# Patient Record
Sex: FEMALE | HISPANIC OR LATINO | URBAN - METROPOLITAN AREA
[De-identification: names, ages, dates, MRNs, and addresses within clinical notes are randomized per-mention and may not be internally consistent; named-entity substitution may affect disease eponyms.]

---

## 2021-05-08 ENCOUNTER — EMERGENCY (EMERGENCY)
Facility: HOSPITAL | Age: 51
LOS: 1 days | Discharge: HOME | End: 2021-05-08
Attending: EMERGENCY MEDICINE
Payer: MEDICAID

## 2021-05-08 VITALS
TEMPERATURE: 101 F | OXYGEN SATURATION: 98 % | SYSTOLIC BLOOD PRESSURE: 154 MMHG | HEART RATE: 104 BPM | WEIGHT: 139.99 LBS | HEIGHT: 67 IN | DIASTOLIC BLOOD PRESSURE: 83 MMHG | RESPIRATION RATE: 20 BRPM

## 2021-05-08 PROCEDURE — 99220: CPT

## 2021-05-08 PROCEDURE — 93010 ELECTROCARDIOGRAM REPORT: CPT

## 2021-05-08 NOTE — ED ADULT TRIAGE NOTE - CHIEF COMPLAINT QUOTE
Patient has been having a burning lower back and leg pain with headaches since Thursday worsening today. Febrile in triage. Describes "stinging urination"

## 2021-05-09 LAB
ALBUMIN SERPL ELPH-MCNC: 4.9 G/DL — SIGNIFICANT CHANGE UP (ref 3.3–5)
ALP SERPL-CCNC: 80 U/L — SIGNIFICANT CHANGE UP (ref 40–120)
ALT FLD-CCNC: 14 U/L — SIGNIFICANT CHANGE UP (ref 10–45)
ANION GAP SERPL CALC-SCNC: 15 MMOL/L — SIGNIFICANT CHANGE UP (ref 5–17)
ANISOCYTOSIS BLD QL: SIGNIFICANT CHANGE UP
APPEARANCE UR: CLEAR — SIGNIFICANT CHANGE UP
APTT BLD: 26.4 SEC — LOW (ref 27.5–35.5)
AST SERPL-CCNC: 17 U/L — SIGNIFICANT CHANGE UP (ref 10–40)
BASE EXCESS BLDV CALC-SCNC: 1.2 MMOL/L — SIGNIFICANT CHANGE UP (ref -2–2)
BASOPHILS # BLD AUTO: 0.19 K/UL — SIGNIFICANT CHANGE UP (ref 0–0.2)
BASOPHILS NFR BLD AUTO: 2.6 % — HIGH (ref 0–2)
BILIRUB SERPL-MCNC: 0.3 MG/DL — SIGNIFICANT CHANGE UP (ref 0.2–1.2)
BILIRUB UR-MCNC: NEGATIVE — SIGNIFICANT CHANGE UP
BUN SERPL-MCNC: 13 MG/DL — SIGNIFICANT CHANGE UP (ref 7–23)
CA-I SERPL-SCNC: 1.25 MMOL/L — SIGNIFICANT CHANGE UP (ref 1.12–1.3)
CALCIUM SERPL-MCNC: 10.2 MG/DL — SIGNIFICANT CHANGE UP (ref 8.4–10.5)
CHLORIDE BLDV-SCNC: 111 MMOL/L — HIGH (ref 96–108)
CHLORIDE SERPL-SCNC: 106 MMOL/L — SIGNIFICANT CHANGE UP (ref 96–108)
CO2 BLDV-SCNC: 26 MMOL/L — SIGNIFICANT CHANGE UP (ref 22–30)
CO2 SERPL-SCNC: 19 MMOL/L — LOW (ref 22–31)
COLOR SPEC: COLORLESS — SIGNIFICANT CHANGE UP
CREAT SERPL-MCNC: 0.7 MG/DL — SIGNIFICANT CHANGE UP (ref 0.5–1.3)
DACRYOCYTES BLD QL SMEAR: SLIGHT — SIGNIFICANT CHANGE UP
DIFF PNL FLD: NEGATIVE — SIGNIFICANT CHANGE UP
ELLIPTOCYTES BLD QL SMEAR: SLIGHT — SIGNIFICANT CHANGE UP
EOSINOPHIL # BLD AUTO: 0 K/UL — SIGNIFICANT CHANGE UP (ref 0–0.5)
EOSINOPHIL NFR BLD AUTO: 0 % — SIGNIFICANT CHANGE UP (ref 0–6)
FERRITIN SERPL-MCNC: 2 NG/ML — LOW (ref 15–150)
GAS PNL BLDV: 139 MMOL/L — SIGNIFICANT CHANGE UP (ref 135–145)
GAS PNL BLDV: SIGNIFICANT CHANGE UP
GAS PNL BLDV: SIGNIFICANT CHANGE UP
GIANT PLATELETS BLD QL SMEAR: PRESENT — SIGNIFICANT CHANGE UP
GLUCOSE BLDV-MCNC: 93 MG/DL — SIGNIFICANT CHANGE UP (ref 70–99)
GLUCOSE SERPL-MCNC: 87 MG/DL — SIGNIFICANT CHANGE UP (ref 70–99)
GLUCOSE UR QL: NEGATIVE — SIGNIFICANT CHANGE UP
HCO3 BLDV-SCNC: 25 MMOL/L — SIGNIFICANT CHANGE UP (ref 21–29)
HCT VFR BLD CALC: 28.6 % — LOW (ref 34.5–45)
HCT VFR BLDA CALC: 25 % — LOW (ref 39–50)
HGB BLD CALC-MCNC: 8 G/DL — LOW (ref 11.5–15.5)
HGB BLD-MCNC: 7.7 G/DL — LOW (ref 11.5–15.5)
HYPOCHROMIA BLD QL: SLIGHT — SIGNIFICANT CHANGE UP
INR BLD: 1.06 RATIO — SIGNIFICANT CHANGE UP (ref 0.88–1.16)
IRON SATN MFR SERPL: 19 UG/DL — LOW (ref 30–160)
IRON SATN MFR SERPL: 4 % — LOW (ref 14–50)
KETONES UR-MCNC: NEGATIVE — SIGNIFICANT CHANGE UP
LACTATE BLDV-MCNC: 1 MMOL/L — SIGNIFICANT CHANGE UP (ref 0.7–2)
LEUKOCYTE ESTERASE UR-ACNC: NEGATIVE — SIGNIFICANT CHANGE UP
LYMPHOCYTES # BLD AUTO: 1.15 K/UL — SIGNIFICANT CHANGE UP (ref 1–3.3)
LYMPHOCYTES # BLD AUTO: 15.5 % — SIGNIFICANT CHANGE UP (ref 13–44)
MACROCYTES BLD QL: SLIGHT — SIGNIFICANT CHANGE UP
MANUAL SMEAR VERIFICATION: SIGNIFICANT CHANGE UP
MCHC RBC-ENTMCNC: 16.5 PG — LOW (ref 27–34)
MCHC RBC-ENTMCNC: 26.9 GM/DL — LOW (ref 32–36)
MCV RBC AUTO: 61.4 FL — LOW (ref 80–100)
MICROCYTES BLD QL: SIGNIFICANT CHANGE UP
MONOCYTES # BLD AUTO: 0.39 K/UL — SIGNIFICANT CHANGE UP (ref 0–0.9)
MONOCYTES NFR BLD AUTO: 5.2 % — SIGNIFICANT CHANGE UP (ref 2–14)
NEUTROPHILS # BLD AUTO: 5.7 K/UL — SIGNIFICANT CHANGE UP (ref 1.8–7.4)
NEUTROPHILS NFR BLD AUTO: 76.7 % — SIGNIFICANT CHANGE UP (ref 43–77)
NITRITE UR-MCNC: NEGATIVE — SIGNIFICANT CHANGE UP
OB PNL STL: NEGATIVE — SIGNIFICANT CHANGE UP
OTHER CELLS CSF MANUAL: 3 ML/DL — LOW (ref 18–22)
OVALOCYTES BLD QL SMEAR: SLIGHT — SIGNIFICANT CHANGE UP
PCO2 BLDV: 38 MMHG — SIGNIFICANT CHANGE UP (ref 35–50)
PH BLDV: 7.43 — SIGNIFICANT CHANGE UP (ref 7.35–7.45)
PH UR: 7.5 — SIGNIFICANT CHANGE UP (ref 5–8)
PLAT MORPH BLD: ABNORMAL
PLATELET # BLD AUTO: 255 K/UL — SIGNIFICANT CHANGE UP (ref 150–400)
PO2 BLDV: 22 MMHG — LOW (ref 25–45)
POIKILOCYTOSIS BLD QL AUTO: SIGNIFICANT CHANGE UP
POLYCHROMASIA BLD QL SMEAR: SLIGHT — SIGNIFICANT CHANGE UP
POTASSIUM BLDV-SCNC: 3.4 MMOL/L — LOW (ref 3.5–5.3)
POTASSIUM SERPL-MCNC: 3.8 MMOL/L — SIGNIFICANT CHANGE UP (ref 3.5–5.3)
POTASSIUM SERPL-SCNC: 3.8 MMOL/L — SIGNIFICANT CHANGE UP (ref 3.5–5.3)
PROT SERPL-MCNC: 7.8 G/DL — SIGNIFICANT CHANGE UP (ref 6–8.3)
PROT UR-MCNC: NEGATIVE — SIGNIFICANT CHANGE UP
PROTHROM AB SERPL-ACNC: 12.7 SEC — SIGNIFICANT CHANGE UP (ref 10.6–13.6)
RBC # BLD: 4.66 M/UL — SIGNIFICANT CHANGE UP (ref 3.8–5.2)
RBC # FLD: 18.8 % — HIGH (ref 10.3–14.5)
RBC BLD AUTO: ABNORMAL
SAO2 % BLDV: 29 % — LOW (ref 67–88)
SARS-COV-2 RNA SPEC QL NAA+PROBE: SIGNIFICANT CHANGE UP
SODIUM SERPL-SCNC: 140 MMOL/L — SIGNIFICANT CHANGE UP (ref 135–145)
SP GR SPEC: 1.01 — LOW (ref 1.01–1.02)
TARGETS BLD QL SMEAR: SLIGHT — SIGNIFICANT CHANGE UP
TIBC SERPL-MCNC: 449 UG/DL — HIGH (ref 220–430)
UIBC SERPL-MCNC: 431 UG/DL — HIGH (ref 110–370)
UROBILINOGEN FLD QL: NEGATIVE — SIGNIFICANT CHANGE UP
WBC # BLD: 7.43 K/UL — SIGNIFICANT CHANGE UP (ref 3.8–10.5)
WBC # FLD AUTO: 7.43 K/UL — SIGNIFICANT CHANGE UP (ref 3.8–10.5)

## 2021-05-09 PROCEDURE — 74177 CT ABD & PELVIS W/CONTRAST: CPT | Mod: 26,MA

## 2021-05-09 PROCEDURE — 99217: CPT

## 2021-05-09 PROCEDURE — 71045 X-RAY EXAM CHEST 1 VIEW: CPT | Mod: 26

## 2021-05-09 RX ORDER — SODIUM CHLORIDE 9 MG/ML
2000 INJECTION, SOLUTION INTRAVENOUS ONCE
Refills: 0 | Status: COMPLETED | OUTPATIENT
Start: 2021-05-09 | End: 2021-05-09

## 2021-05-09 RX ORDER — ACETAMINOPHEN 500 MG
975 TABLET ORAL ONCE
Refills: 0 | Status: COMPLETED | OUTPATIENT
Start: 2021-05-09 | End: 2021-05-09

## 2021-05-09 RX ORDER — CEFTRIAXONE 500 MG/1
1000 INJECTION, POWDER, FOR SOLUTION INTRAMUSCULAR; INTRAVENOUS ONCE
Refills: 0 | Status: COMPLETED | OUTPATIENT
Start: 2021-05-09 | End: 2021-05-09

## 2021-05-09 RX ORDER — FERROUS SULFATE 325(65) MG
1 TABLET ORAL
Qty: 60 | Refills: 0
Start: 2021-05-09 | End: 2021-06-07

## 2021-05-09 RX ORDER — CEFTRIAXONE 500 MG/1
1000 INJECTION, POWDER, FOR SOLUTION INTRAMUSCULAR; INTRAVENOUS EVERY 24 HOURS
Refills: 0 | Status: DISCONTINUED | OUTPATIENT
Start: 2021-05-09 | End: 2021-05-12

## 2021-05-09 RX ORDER — CEFDINIR 250 MG/5ML
1 POWDER, FOR SUSPENSION ORAL
Qty: 20 | Refills: 0
Start: 2021-05-09 | End: 2021-05-18

## 2021-05-09 RX ORDER — SODIUM CHLORIDE 9 MG/ML
1000 INJECTION, SOLUTION INTRAVENOUS
Refills: 0 | Status: DISCONTINUED | OUTPATIENT
Start: 2021-05-09 | End: 2021-05-12

## 2021-05-09 RX ORDER — FERROUS SULFATE 325(65) MG
325 TABLET ORAL
Refills: 0 | Status: DISCONTINUED | OUTPATIENT
Start: 2021-05-09 | End: 2021-05-12

## 2021-05-09 RX ORDER — KETOROLAC TROMETHAMINE 30 MG/ML
15 SYRINGE (ML) INJECTION EVERY 6 HOURS
Refills: 0 | Status: DISCONTINUED | OUTPATIENT
Start: 2021-05-09 | End: 2021-05-09

## 2021-05-09 RX ADMIN — Medication 15 MILLIGRAM(S): at 15:45

## 2021-05-09 RX ADMIN — Medication 15 MILLIGRAM(S): at 05:36

## 2021-05-09 RX ADMIN — Medication 15 MILLIGRAM(S): at 23:40

## 2021-05-09 RX ADMIN — SODIUM CHLORIDE 150 MILLILITER(S): 9 INJECTION, SOLUTION INTRAVENOUS at 22:49

## 2021-05-09 RX ADMIN — SODIUM CHLORIDE 2000 MILLILITER(S): 9 INJECTION, SOLUTION INTRAVENOUS at 03:00

## 2021-05-09 RX ADMIN — SODIUM CHLORIDE 150 MILLILITER(S): 9 INJECTION, SOLUTION INTRAVENOUS at 05:25

## 2021-05-09 RX ADMIN — Medication 975 MILLIGRAM(S): at 00:37

## 2021-05-09 RX ADMIN — SODIUM CHLORIDE 2000 MILLILITER(S): 9 INJECTION, SOLUTION INTRAVENOUS at 00:38

## 2021-05-09 RX ADMIN — Medication 325 MILLIGRAM(S): at 21:04

## 2021-05-09 RX ADMIN — Medication 975 MILLIGRAM(S): at 21:11

## 2021-05-09 RX ADMIN — CEFTRIAXONE 100 MILLIGRAM(S): 500 INJECTION, POWDER, FOR SOLUTION INTRAMUSCULAR; INTRAVENOUS at 02:47

## 2021-05-09 RX ADMIN — SODIUM CHLORIDE 150 MILLILITER(S): 9 INJECTION, SOLUTION INTRAVENOUS at 15:45

## 2021-05-09 RX ADMIN — Medication 15 MILLIGRAM(S): at 06:48

## 2021-05-09 RX ADMIN — Medication 975 MILLIGRAM(S): at 10:31

## 2021-05-09 NOTE — ED PROVIDER NOTE - NS ED ROS FT
CONST: + fevers, + chills, no trauma  EYES: no pain, no blurry vision   ENT: no sore throat, no epistaxis, no rhinorrhea  CV: no chest pain, no palpitations, no orthopnea, no extremity pain or swelling  RESP: no shortness of breath, no cough, no sputum, no pleurisy, no wheezing  ABD: no abdominal pain, + nausea, no vomiting, no diarrhea, no black or bloody stool  : + dysuria, no hematuria, + frequency, no urgency  MSK: no back pain, no neck pain, no extremity pain  NEURO: no headache, no sensory disturbances, no focal weakness, no dizziness  HEME: no easy bleeding or bruising  SKIN: no diaphoresis, no rash

## 2021-05-09 NOTE — ED CDU PROVIDER INITIAL DAY NOTE - ATTENDING CONTRIBUTION TO CARE
Patient is a 49 yo F with history of anemia here for 4 days of dysuria, fevers, chills and nausea. She reports severe back and leg pain. Denies vomiting. No chest pain, shortness of breath, cough, sore throat. Denies a history of kidney stones. No history of DVT/PE.    VS noted  Gen. uncomfortable, complaining of pain  HEENT: EOMI, mmm  Lungs: CTAB/L no C/ W /R   CVS: RRR   Abd; Soft, mild suprapubic tenderness, b/l CVA tenderness  Ext: no edema  Skin: no rash  Neuro AAOx3 non focal clear speech  a/p: flank pain, fevers, chills, nausea with dysuria - concern for pyelonephritis. Patient in CDU for continued IVF hydration, IV abx and continued monitoring.   - Jerrica MENDEZ. Patient is a 49 yo F with history of anemia here for 4 days of dysuria, fevers, chills and nausea. She reports severe back and leg pain. Denies vomiting. No chest pain, shortness of breath, cough, sore throat. Denies a history of kidney stones. No history of DVT/PE.    VS noted  Gen. uncomfortable, complaining of pain  HEENT: EOMI, mmm  Lungs: CTAB/L no C/ W /R   CVS: RRR   Abd; Soft, mild suprapubic tenderness, b/l CVA tenderness  Ext: no edema  Skin: no rash  Neuro AAOx3 non focal clear speech  a/p: flank pain, fevers, chills, nausea with dysuria - concern for pyelonephritis vs MSK pain. Patient in CDU for continued IVF hydration, IV abx and continued monitoring.   - Jerrica MENDEZ.

## 2021-05-09 NOTE — ED CDU PROVIDER INITIAL DAY NOTE - PROGRESS NOTE DETAILS
DANIEL Santos:  [id number] Patient seen at bedside in NAD.  VSS.  Patient resting comfortably but reports b/l back pain. + CVA tenderness on exam. will give tylenol and continue to monitor. if pt afebrile over the next few hours will likely discharge home DANIEL Santos:  [id number] Patient seen at bedside in NAD.  VSS.  Patient resting comfortably without complaints and Tylenol improved pts pain. Occult negative. CXR negative. pending CT AP pt con't to c/o lower back pain and now with pain in both lower legs and muscle pain.  no numbness or paresthesia.  pt also c/o h/a.   #[id number] used for hx.  P/E - alert, mod pain.  Back/spine - no rash or lesion.  tender para spinal muscles - no midline tenderness.  motor and sensory intact bilat.  no CVAT -  CT abdo/pelvis - fullness or left renal pelvis without obstructing stone.  ?partially treated pyelo vs MSK.  analgesia.  con't IV Ceftriaxone and reassess. DANIEL Santos: Patient seen at bedside in NAD.  VSS.  Patient resting and continues to endorse body aches specifically in mid back radiating to lower back. will give toradol and reassess. Will keep pt overnight for continued hydration IV abx and pain control per ED attending Dr. Gita mensah DANIEL Santos: Patient seen at bedside in NAD.  VSS.  Patient resting and continues to endorse body aches specifically in mid back radiating to lower back. will give toradol and reassess. Will keep pt overnight for continued hydration IV abx and pain control per ED attending Dr. Gita mensah     [id number] CDU PROGRESS NOTE DANIEL EDMOND: Received pt at 1900 sign-out. Pt resting in stretcher in NAD. Case/plan reviewed. VSS. Pt c/o bilateral lower back pain 4/10 with headache and body ache. On exam,+b/l CVA ttp, R>L. S1 S2 noted, RRR, lungs CTA b/l, BS x4 with soft, nontender abdomen. Will give Tylenol 975mg po and closely monitor. CDU PROGRESS NOTE PA FELI: Pt resting comfortably, reports feeling better. Will continue to monitor. CDU PROGRESS NOTE PA FELI: Pt re-vitaled, noted to have temp of 100.5F. Pt resting in stretcher, no signs of distress, reports generalized body aches and chills. Exam significant for bilateral CVA tenderness. Abdomens soft, non tender. No rebound or guarding. Will give Toradol 15mg IV and closely monitor.

## 2021-05-09 NOTE — ED PROVIDER NOTE - OBJECTIVE STATEMENT
50F with pmhx of anemia, previously on iron supplements presenting with CC of dysuria, fevers, chills, nausea for 4 days. Started having flank pain 50F with pmhx of anemia, previously on iron supplements presenting with CC of dysuria, fevers, chills, nausea for 4 days. Started having flank pain today radiating down to her hips. No vomiting, diarrhea, abdominal pain. No cough, sick contacts. Pt has no history of abdominal surgeries or kidney stones.

## 2021-05-09 NOTE — ED CDU PROVIDER INITIAL DAY NOTE - ABDOMINAL EXAM
Nt/Nd anterior abdominal wall without rebound, guarding or rigidity. +CVA ttp b/l, R>L./soft/nontender.../nondistended

## 2021-05-09 NOTE — ED CDU PROVIDER INITIAL DAY NOTE - DETAILS
Pyelonephritis  -IV ABX  -IVF  -PAIN/FEVER CONTROL  -RITESH CASTILLO  -CASE D/W ATTENDING Dr. Becerril

## 2021-05-09 NOTE — ED ADULT NURSE REASSESSMENT NOTE - NS ED NURSE REASSESS COMMENT FT1
@1900 Pt received from NILESH Reid. Pt oriented to CDU & plan of care was discussed. Pt A&O x 4. Pt in CDU for continued IVF hydration, IV abx and continued monitoring. Pt c/o 4/10 headache, and back pain, Pt declines any medications at this time. Pt denies any fever, chills, burning with urination, or urinary frequency or urgency. Pt pending Rocephin @2am. V/S stable, pt afebrile,  IV in place, patent and free of signs of infiltration. Pt resting in bed. Safety & comfort measures maintained. Call bell in reach. Will continue to monitor.

## 2021-05-09 NOTE — ED CDU PROVIDER DISPOSITION NOTE - CLINICAL COURSE
49 yo female Azeri-speaking female with PMHx anemia (was on iron pills previously presented to the ED c/o fever, chills, dysuria, nausea, and low back pain x 4 days. Symptoms started out mild then worsened yesterday, w/ flank pain radiating down to her hips, diffuse body aches. Additionally endorsed mild dry cough and mild frequency. Denied chest pain, shortness of breath, vomiting, hx abdominal surgeries, hx kidney stones, vaginal bleeding, BRBPR, melena, easy bruising, hematuria, neck pain/stiffness.  ED Course: Labs notable for h/h 7.7/28.6 (no anemia sxs), cmp wnl, RVP/COVID negative, lactate negative, UA negative however pt with +b/l CVA ttp, R>L concerning for pyelonephritis so patient was started on IV ceftriaxone 1g, IVF w/ improvement of sxs. Sent to CDU for continued IVF hydration, IV abx and continued monitoring. 49 yo female Qatari-speaking female with PMHx anemia (was on iron pills previously presented to the ED c/o fever, chills, dysuria, nausea, and low back pain x 4 days. Symptoms started out mild then worsened yesterday, w/ flank pain radiating down to her hips, diffuse body aches. Additionally endorsed mild dry cough and mild frequency. Denied chest pain, shortness of breath, vomiting, hx abdominal surgeries, hx kidney stones, vaginal bleeding, BRBPR, melena, easy bruising, hematuria, neck pain/stiffness.  ED Course: Labs notable for h/h 7.7/28.6 (no anemia sxs), cmp wnl, RVP/COVID negative, lactate negative, UA negative however pt with +b/l CVA ttp, R>L concerning for pyelonephritis so patient was started on IV ceftriaxone 1g, IVF w/ improvement of sxs. Sent to CDU for continued IVF hydration, IV abx and continued monitoring. Patient remained stable while in CDU and afebrile. pain well controlled. cleared for discharge home per ED attending Dr. Pelayo 49 yo female [place]-speaking female with PMHx anemia (was on iron pills previously presented to the ED c/o fever, chills, dysuria, nausea, and low back pain x 4 days. Symptoms started out mild then worsened yesterday, w/ flank pain radiating down to her hips, diffuse body aches. Additionally endorsed mild dry cough and mild frequency. Denied chest pain, shortness of breath, vomiting, hx abdominal surgeries, hx kidney stones, vaginal bleeding, BRBPR, melena, easy bruising, hematuria, neck pain/stiffness.  ED Course: Labs notable for h/h 7.7/28.6 (no anemia sxs), cmp wnl, RVP/COVID negative, lactate negative, UA negative however pt with +b/l CVA ttp, R>L concerning for pyelonephritis so patient was started on IV ceftriaxone 1g, IVF w/ improvement of sxs. Sent to CDU for continued IVF hydration, IV abx and continued monitoring. Patient remained stable while in CDU and afebrile. pain well controlled. cleared for discharge home per ED attending Dr. Pelayo. Patient remained stable while in CDU and afebril. pain controlled with Tylenol and Toradol. Unclear source of fever therefore CXR obtained which was negative. CT consistent with pyelo without hydro. Fecal Occult obtained as pt noted to be anemic but has known iron deficiency anemia and labs consistent with CANDIDA. Patient discharged on PO cefdinir, PO iron, and was recommended heme onc outpt f/u. Cleared for discharge home per ED attending Dr. Pelayo 51 yo female Northern Irish-speaking female with PMHx anemia (was on iron pills previously presented to the ED c/o fever, chills, dysuria, nausea, and low back pain x 4 days. Symptoms started out mild then worsened yesterday, w/ flank pain radiating down to her hips, diffuse body aches. Additionally endorsed mild dry cough and mild frequency. Denied chest pain, shortness of breath, vomiting, hx abdominal surgeries, hx kidney stones, vaginal bleeding, BRBPR, melena, easy bruising, hematuria, neck pain/stiffness.  ED Course: Labs notable for h/h 7.7/28.6 (no anemia sxs), cmp wnl, RVP/COVID negative, lactate negative, UA negative however pt with +b/l CVA ttp, R>L concerning for pyelonephritis so patient was started on IV ceftriaxone 1g, IVF w/ improvement of sxs. Sent to CDU for continued IVF hydration, IV abx and continued monitoring. Patient remained stable while in CDU and afebrile. pain well controlled. cleared for discharge home per ED attending Dr. Pelayo. Patient remained stable while in CDU and afebril. pain controlled with Tylenol and Toradol. Unclear source of fever therefore CXR obtained which was negative. CT consistent with pyelo without hydro. Fecal Occult obtained as pt noted to be anemic but has known iron deficiency anemia and labs consistent with CANDIDA. Patient had persistent pain so and was borderline febrile so she was observed overnight again to ensure no worsening symptoms. She felt improved in the morning and tolerated PO without issue. Her repeat H/H remained stable. Case d/w ED attending Dr. Bond who cleared patient for discharge home. Patient discharged on PO cefdinir, PO iron, and was recommended heme onc outpt f/u. Patient was stable at time of discharge.

## 2021-05-09 NOTE — ED PROVIDER NOTE - ATTENDING CONTRIBUTION TO CARE
Patient is a 51 yo F with history of anemia here for 4 days of dysuria, fevers, chills and nausea. She reports severe back and leg pain. Denies vomiting. No chest pain, shortness of breath, cough, sore throat. Denies a history of kidney stones. No history of DVT/PE.    VS noted  Gen. uncomfortable, complaining of pain  HEENT: EOMI, mmm  Lungs: CTAB/L no C/ W /R   CVS: RRR   Abd; Soft, mild suprapubic tenderness, b/l CVA tenderness  Ext: no edema  Skin: no rash  Neuro AAOx3 non focal clear speech  a/p: flank pain, fevers, chills, nausea with dysuria - concern for pyelonephritis. Will give pain medication, check labs, u/a..   - Jerrica MENDEZ Patient is a 49 yo F with history of anemia here for 4 days of dysuria, fevers, chills and nausea. She reports severe back and leg pain. Denies vomiting. No chest pain, shortness of breath, cough, sore throat. Denies a history of kidney stones. No history of DVT/PE.    VS noted  Gen. uncomfortable, complaining of pain  HEENT: EOMI, mmm  Lungs: CTAB/L no C/ W /R   CVS: RRR   Abd; Soft, mild suprapubic tenderness, b/l CVA tenderness  Ext: no edema, no calf tenderness  Skin: no rash  Neuro AAOx3 non focal clear speech  a/p: flank pain, fevers, chills, nausea with dysuria - concern for pyelonephritis vs MSK pain. Will give pain medication, check labs, u/a..   - Jerrica MENDEZ

## 2021-05-09 NOTE — ED ADULT NURSE REASSESSMENT NOTE - ANCILLARY STATUS
continued IVF hydration, IV abx and continued monitoring
IVF, pain management, IV Rocephin q24 hours.

## 2021-05-09 NOTE — ED ADULT NURSE REASSESSMENT NOTE - NS ED NURSE REASSESS COMMENT FT1
07.00 Am Received the Pt from  NILESH Mullins  Pt is Observed for back pain with fever & chills  . Received the Pt A&OX 4 obeys commands Coni N/V/D fever chills cp SOB   Comfort care & safety measures continued  IV site looks clean & dry no signs of infiltration noted pt denies  pain IV site .  Pt is advised to call for help  call bell with in the reach pt verbalized the understanding .  pending CDU  MD godfrey . GCS 15/15 A&OX 4 PERRLA  size 3 Strong upper & lower extremities steady gait   No facial droop  No Hand Leg drop denies numbness tingling Continue to monitor  10.30 AM  C/O chills Medicated as per order

## 2021-05-09 NOTE — ED ADULT NURSE REASSESSMENT NOTE - NS ED NURSE REASSESS COMMENT FT1
Received pt from NILESH Arce , received pt alert and responsive, oriented x4, denies any respiratory distress, SOB, or difficulty breathing. Pt transferred to CDU for urinary complaints. Endorses lower back with R flank pain and headache currently 9/10, pt medicated per order. Place don ordered IVF tolerating well.   IV in place, patent and free of signs of infiltration, V/S stable, pt afebrile. Pt educated on unit and unit rules, instructed patient to notify RN of any needed assistance, Pt verbalizes understanding, Call bell placed within reach. Safety maintained. Will continue to monitor.  Bjorn ID: [id number]. Received pt from NILESH Arce , received pt alert and responsive, oriented x4, denies any respiratory distress, SOB, or difficulty breathing. Pt transferred to CDU for urinary complaints. Endorses lower back with L flank pain and headache currently 9/10, pt medicated per order. Placed on ordered IVF tolerating well.   IV in place, patent and free of signs of infiltration, V/S stable, pt afebrile. Pt educated on unit and unit rules, instructed patient to notify RN of any needed assistance, Pt verbalizes understanding, Call bell placed within reach. Safety maintained. Will continue to monitor.  Bjorn ID: [id number].

## 2021-05-09 NOTE — ED CDU PROVIDER INITIAL DAY NOTE - NO PERTINENT FAMILY HISTORY
Detail Level: Zone Detail Level: Simple Include Location In Plan?: No <<----- Click to add NO pertinent Family History

## 2021-05-09 NOTE — ED CDU PROVIDER DISPOSITION NOTE - NSFOLLOWUPCLINICS_GEN_ALL_ED_FT
[facility] General Internal Medicine  General Internal Medicine  [address] [place] [address]  Phone: [phone]  Fax:   Follow Up Time: 1-3 Days    [facility]  Hematology/Oncology  [address] [place] [address]  Phone: [phone]  Fax:   Follow Up Time: 4-6 Days

## 2021-05-09 NOTE — ED CDU PROVIDER DISPOSITION NOTE - NSFOLLOWUPINSTRUCTIONS_ED_ALL_ED_FT
1. Follow up with your primary doctor in 1-2 days.    2. Please bring a copy of all your results with you to your appointments.     3. Rest, stay hydrated. Drink plenty of water.     4. Take Cefpodoxime as prescribed.    5. Take Tylenol 650 mg every 6 hours as needed for pain or fever over 100.3 degrees. Take Motrin 600 mg every 8 hours with food for additional relief.     6. Return to the Emergency Department immediately if you develop any new/worsening symptoms including worsening pain, persistent fever, vomiting, chest pain, shortness of breath or any other concerning symptoms.   ________________________________________________________________________________________________________________________  1. Ronda un seguimiento con crandall médico de cabecera en 1-2 días.    2. Por favor traiga daniel copia de todos obdulia resultados a obdulia citas.    3. Descanse, manténgase hidratado. Beber abundante agua.    4. [place] cefpodoxima según lo prescrito.    5. [place] Tylenol 650 mg cada 6 horas según sea necesario para el dolor o la fiebre de más de 100,3 grados. [place] Motrin 600 mg cada 8 horas con alimentos para un alivio adicional.    6. Regrese al Departamento de Emergencias de inmediato si presenta síntomas nuevos o que empeoran, robert dolor que empeora, fiebre persistente, vómitos, dolor en el pecho, dificultad para respirar o cualquier otro síntoma que le preocupe. 1. Follow up with your primary doctor in 1-2 days.    2. Please bring a copy of all your results with you to your appointments.     3. Rest, stay hydrated. Drink plenty of water.     4. Take cefdinir as prescribed.    5. Take Tylenol 650 mg every 6 hours as needed for pain or fever over 100.3 degrees. Take Motrin 600 mg every 8 hours with food for additional relief.     6. Return to the Emergency Department immediately if you develop any new/worsening symptoms including worsening pain, persistent fever, vomiting, chest pain, shortness of breath or any other concerning symptoms.   ________________________________________________________________________________________________________________________  1. Ronda un seguimiento con crandall médico de cabecera en 1-2 días.    2. Por favor traiga daniel copia de todos obdulia resultados a obdulia citas.    3. Descanse, manténgase hidratado. Beber abundante agua.    4. [facility] cefdinir según lo prescrito.    5. [facility] Tylenol 650 mg cada 6 horas según sea necesario para el dolor o la fiebre de más de 100,3 grados. [facility] Motrin 600 mg cada 8 horas con alimentos para un alivio adicional.    6. Regrese al Departamento de Emergencias de inmediato si presenta síntomas nuevos o que empeoran, robert dolor que empeora, fiebre persistente, vómitos, dolor en el pecho, dificultad para respirar o cualquier otro síntoma que le preocupe. 1. Follow up with your primary doctor in 1-2 days.    2. Please bring a copy of all your results with you to your appointments.     3. Rest, stay hydrated. Drink plenty of water.     4. Take cefdinir as prescribed.    5. Take Tylenol 650 mg every 6 hours as needed for pain or fever over 100.3 degrees. Take Motrin 600 mg every 8 hours with food for additional relief.     6. Return to the Emergency Department immediately if you develop any new/worsening symptoms including worsening pain, persistent fever, vomiting, chest pain, shortness of breath or any other concerning symptoms.     follow up with hematology regarding your anemia [phone] and take iron pills twice daily for your iron deficiency anemia   ________________________________________________________________________________________________________________________  1. Ronda un seguimiento con crandall médico de cabecera en 1-2 días.    2. Por favor traiga daniel copia de todos obdulia resultados a obdulia citas.    3. Descanse, manténgase hidratado. Beber abundante agua.    4. [place] cefdinir según lo prescrito.    5. [place] Tylenol 650 mg cada 6 horas según sea necesario para el dolor o la fiebre de más de 100,3 grados. [place] Motrin 600 mg cada 8 horas con alimentos para un alivio adicional.    6. Regrese al Departamento de Emergencias de inmediato si presenta síntomas nuevos o que empeoran, robert dolor que empeora, fiebre persistente, vómitos, dolor en el pecho, dificultad para respirar o cualquier otro síntoma que le preocupe.    ronda un seguimiento con hematología con respecto a crandall anemia [phone] y tome pastillas de alistair dos veces al día para crandall anemia por deficiencia de alistair 1. Follow up with your our general internal medicine clinic ([phone]) within the next 2-3 days. Your information was placed in our referral program so that an appointment can be scheduled for you. You should receive a call with this appointment within 48 hours, if you do not receive a call within that time, please call [phone].     2. Please note, your red blood cell count and iron level were low, so you likely have Iron Deficiency Anemia, recommend re-starting your Iron supplements, a prescriptions for this was sent to your pharmacy. Follow up with hematology regarding your anemia [phone] and take iron pills twice daily for your iron deficiency anemia     3. Please bring a copy of all your results with you to your appointments.     4. Rest, stay hydrated. Drink plenty of water.     5. Take cefdinir as prescribed.    6. Take Tylenol 650 mg every 6 hours as needed for pain or fever over 100.3 degrees. Take Motrin 600 mg every 8 hours with food for additional relief.     7. Return to the Emergency Department immediately if you develop any new/worsening symptoms including worsening pain, persistent fever, vomiting, chest pain, shortness of breath or any other concerning symptoms.     ________________________________________________________________________________________________________________________  1. Ronda un seguimiento con crandall clínica de medicina interna general ([phone]) dentro de los próximos 2-3 días. Crandall información se colocó en nuestro programa de referencias para que se pueda programar daniel mekhi para usted. Debería recibir daniel llamada con esta mekhi dentro de las 48 horas, si no recibe daniel llamada dentro de miguel tiempo, llame al [phone].    2. Tenga en cuenta que crandall recuento de glóbulos rojos y el nivel de alistair manpreet bajos, por lo que es probable que tenga anemia por deficiencia de alistair, recomiende reiniciar obdulia suplementos de alistair, se envió daniel receta a crandall farmacia. Ronda un seguimiento con hematología con respecto a crandall anemia [phone] y tome pastillas de alistair dos veces al día para crandall anemia por deficiencia de alistair    3. Favor de traer daniel copia de todos obdulia resultados a obdulia citas.    4. Descanse, manténgase hidratado. Beber abundante agua.    5. [place] cefdinir según lo prescrito.    6. [place] 650 mg de Tylenol cada 6 horas según sea necesario para el dolor o la fiebre de más de 100,3 grados. [place] Motrin 600 mg cada 8 horas con alimentos para un alivio adicional.    7. Regrese al Departamento de Emergencias de inmediato si presenta síntomas nuevos o que empeoran, robert dolor que empeora, fiebre persistente, vómitos, dolor en el pecho, dificultad para respirar o cualquier otro síntoma relacionado.

## 2021-05-09 NOTE — ED PROVIDER NOTE - CLINICAL SUMMARY MEDICAL DECISION MAKING FREE TEXT BOX
50F presenting with dysuria/f/c/nausea PE: tachy, febrile, CVA ttp b/l Ddx: PT likely has pyelo. Plan: Labs, urine, CXR, likely admit vs CDU, pt very uncomfortable. ARMAAN Dobbins PGY2

## 2021-05-09 NOTE — ED ADULT NURSE NOTE - OBJECTIVE STATEMENT
Pt A&Ox4, ambulatory with steady gait. Pt presented to ED with c/o back and leg pain. When pt points to area of concern, more in the flank. Describes the pain as 'burning'. Pain has come and gone since Thursday but tonight the pain got worse and did not go away. Also endorses pain with urination. Denies seeing any blood in urine. Pt denies CP, SOB, N/V/D, dizziness, LOC, weakness, numbness, tingling.   Felt warm at home but did not check temp, noted to be febrile upon arrival.

## 2021-05-09 NOTE — ED CDU PROVIDER INITIAL DAY NOTE - MUSCULOSKELETAL, MLM
+b/l CVA ttp, R>L. Spine appears normal, range of motion is not limited, no muscle or joint tenderness. No rash overlying costovertebral region. Intact flexion/extension of neck.

## 2021-05-09 NOTE — ED CDU PROVIDER DISPOSITION NOTE - NSPTACCESSSVCSAPPTDETAILS_ED_ALL_ED_FT
URGENT: patient does not have primary care doctor, requires follow up within 48 hours with general internal medicine clinic to review results and receive further care.

## 2021-05-09 NOTE — ED PROVIDER NOTE - PHYSICAL EXAMINATION
Const: Well-nourished, Well-developed, appearing stated age.  Eyes: no conjunctival injection, and symmetrical lids.  HEENT: Head NCAT, no lesions. Atraumatic external nose and ears. Moist MM.  Neck: Symmetric, trachea midline.   CVS: +S1/S2, Peripheral pulses 2+ and equal in all extremities.  RESP: Unlabored respiratory effort. Clear to auscultation bilaterally.  GI: Nontender/Nondistended, + CVA tenderness b/l.   MSK: Normocephalic/Atraumatic, Lower Extremities w/o calf tenderness or edema b/l.   Skin: Warm, dry and intact.   Neuro: CNs II-XII grossly intact. Motor & Sensation grossly intact.  Psych: Awake, Alert, & Oriented (AAO) x3. Appropriate mood and affect.

## 2021-05-09 NOTE — ED CDU PROVIDER INITIAL DAY NOTE - OBJECTIVE STATEMENT
49 yo female PMhx anemia (was on iron pills previously presents to the ED c/o fever, chills, dysuria, nausea, and low back pain x 4 days. Symptoms started out mild then worsened yesterday, w/ flank pain radiating down to her hips, diffuse body aches. Additionally endorsing mild dry cough. C/o mild frequency. Denies chest pain, shortness of breath, vomiting, hx abdominal surgeries, hx kidney stones, vaginal bleeding, BRBPR, melena, easy bruising, hematuria, neck pain/stiffness.    In ED labs notable for h/h 7.7/28.6 (no anemia sxs), cmp wnl, lactate negative, UA negative however pt with +b/l CVA ttp, R>L concerning for pyelonephritis so patient was started on IV ceftriaxone 1g, IVF w/ improvement of sxs. Sent to CDU for continued IVF hydration, IV abx and continued monitoring. Case d/w ED attending Dr. Becerril.

## 2021-05-10 VITALS
OXYGEN SATURATION: 98 % | HEART RATE: 76 BPM | SYSTOLIC BLOOD PRESSURE: 135 MMHG | DIASTOLIC BLOOD PRESSURE: 79 MMHG | RESPIRATION RATE: 18 BRPM | TEMPERATURE: 98 F

## 2021-05-10 LAB
ANISOCYTOSIS BLD QL: SIGNIFICANT CHANGE UP
BASOPHILS # BLD AUTO: 0.05 K/UL — SIGNIFICANT CHANGE UP (ref 0–0.2)
BASOPHILS NFR BLD AUTO: 0.9 % — SIGNIFICANT CHANGE UP (ref 0–2)
CULTURE RESULTS: SIGNIFICANT CHANGE UP
DACRYOCYTES BLD QL SMEAR: SLIGHT — SIGNIFICANT CHANGE UP
ELLIPTOCYTES BLD QL SMEAR: SIGNIFICANT CHANGE UP
EOSINOPHIL # BLD AUTO: 0.05 K/UL — SIGNIFICANT CHANGE UP (ref 0–0.5)
EOSINOPHIL NFR BLD AUTO: 0.9 % — SIGNIFICANT CHANGE UP (ref 0–6)
GIANT PLATELETS BLD QL SMEAR: PRESENT — SIGNIFICANT CHANGE UP
HCT VFR BLD CALC: 27.3 % — LOW (ref 34.5–45)
HGB BLD-MCNC: 7.2 G/DL — LOW (ref 11.5–15.5)
HYPOCHROMIA BLD QL: SIGNIFICANT CHANGE UP
LYMPHOCYTES # BLD AUTO: 1.04 K/UL — SIGNIFICANT CHANGE UP (ref 1–3.3)
LYMPHOCYTES # BLD AUTO: 20.3 % — SIGNIFICANT CHANGE UP (ref 13–44)
MANUAL SMEAR VERIFICATION: SIGNIFICANT CHANGE UP
MCHC RBC-ENTMCNC: 16.3 PG — LOW (ref 27–34)
MCHC RBC-ENTMCNC: 26.4 GM/DL — LOW (ref 32–36)
MCV RBC AUTO: 61.6 FL — LOW (ref 80–100)
MICROCYTES BLD QL: SIGNIFICANT CHANGE UP
MONOCYTES # BLD AUTO: 0.09 K/UL — SIGNIFICANT CHANGE UP (ref 0–0.9)
MONOCYTES NFR BLD AUTO: 1.8 % — LOW (ref 2–14)
NEUTROPHILS # BLD AUTO: 3.89 K/UL — SIGNIFICANT CHANGE UP (ref 1.8–7.4)
NEUTROPHILS NFR BLD AUTO: 76.1 % — SIGNIFICANT CHANGE UP (ref 43–77)
PLAT MORPH BLD: ABNORMAL
PLATELET # BLD AUTO: 161 K/UL — SIGNIFICANT CHANGE UP (ref 150–400)
POIKILOCYTOSIS BLD QL AUTO: SIGNIFICANT CHANGE UP
POLYCHROMASIA BLD QL SMEAR: SLIGHT — SIGNIFICANT CHANGE UP
RBC # BLD: 4.43 M/UL — SIGNIFICANT CHANGE UP (ref 3.8–5.2)
RBC # FLD: 18.8 % — HIGH (ref 10.3–14.5)
RBC BLD AUTO: ABNORMAL
SPECIMEN SOURCE: SIGNIFICANT CHANGE UP
TARGETS BLD QL SMEAR: SLIGHT — SIGNIFICANT CHANGE UP
WBC # BLD: 5.11 K/UL — SIGNIFICANT CHANGE UP (ref 3.8–10.5)
WBC # FLD AUTO: 5.11 K/UL — SIGNIFICANT CHANGE UP (ref 3.8–10.5)

## 2021-05-10 PROCEDURE — 80053 COMPREHEN METABOLIC PANEL: CPT

## 2021-05-10 PROCEDURE — 87086 URINE CULTURE/COLONY COUNT: CPT

## 2021-05-10 PROCEDURE — 87635 SARS-COV-2 COVID-19 AMP PRB: CPT

## 2021-05-10 PROCEDURE — 82330 ASSAY OF CALCIUM: CPT

## 2021-05-10 PROCEDURE — 71045 X-RAY EXAM CHEST 1 VIEW: CPT

## 2021-05-10 PROCEDURE — 83550 IRON BINDING TEST: CPT

## 2021-05-10 PROCEDURE — 85025 COMPLETE CBC W/AUTO DIFF WBC: CPT

## 2021-05-10 PROCEDURE — 83605 ASSAY OF LACTIC ACID: CPT

## 2021-05-10 PROCEDURE — 84295 ASSAY OF SERUM SODIUM: CPT

## 2021-05-10 PROCEDURE — 96375 TX/PRO/DX INJ NEW DRUG ADDON: CPT

## 2021-05-10 PROCEDURE — 74177 CT ABD & PELVIS W/CONTRAST: CPT

## 2021-05-10 PROCEDURE — 84702 CHORIONIC GONADOTROPIN TEST: CPT

## 2021-05-10 PROCEDURE — 96374 THER/PROPH/DIAG INJ IV PUSH: CPT | Mod: XU

## 2021-05-10 PROCEDURE — 85730 THROMBOPLASTIN TIME PARTIAL: CPT

## 2021-05-10 PROCEDURE — 85018 HEMOGLOBIN: CPT

## 2021-05-10 PROCEDURE — 82728 ASSAY OF FERRITIN: CPT

## 2021-05-10 PROCEDURE — 82803 BLOOD GASES ANY COMBINATION: CPT

## 2021-05-10 PROCEDURE — 96361 HYDRATE IV INFUSION ADD-ON: CPT

## 2021-05-10 PROCEDURE — 85610 PROTHROMBIN TIME: CPT

## 2021-05-10 PROCEDURE — 82272 OCCULT BLD FECES 1-3 TESTS: CPT

## 2021-05-10 PROCEDURE — 81003 URINALYSIS AUTO W/O SCOPE: CPT

## 2021-05-10 PROCEDURE — 85014 HEMATOCRIT: CPT

## 2021-05-10 PROCEDURE — 82947 ASSAY GLUCOSE BLOOD QUANT: CPT

## 2021-05-10 PROCEDURE — 0225U NFCT DS DNA&RNA 21 SARSCOV2: CPT

## 2021-05-10 PROCEDURE — 82435 ASSAY OF BLOOD CHLORIDE: CPT

## 2021-05-10 PROCEDURE — G0378: CPT

## 2021-05-10 PROCEDURE — 99284 EMERGENCY DEPT VISIT MOD MDM: CPT | Mod: 25

## 2021-05-10 PROCEDURE — 96376 TX/PRO/DX INJ SAME DRUG ADON: CPT

## 2021-05-10 PROCEDURE — 87040 BLOOD CULTURE FOR BACTERIA: CPT

## 2021-05-10 PROCEDURE — 84132 ASSAY OF SERUM POTASSIUM: CPT

## 2021-05-10 PROCEDURE — 83540 ASSAY OF IRON: CPT

## 2021-05-10 RX ORDER — ACETAMINOPHEN 500 MG
975 TABLET ORAL ONCE
Refills: 0 | Status: COMPLETED | OUTPATIENT
Start: 2021-05-10 | End: 2021-05-10

## 2021-05-10 RX ADMIN — SODIUM CHLORIDE 150 MILLILITER(S): 9 INJECTION, SOLUTION INTRAVENOUS at 05:54

## 2021-05-10 RX ADMIN — CEFTRIAXONE 100 MILLIGRAM(S): 500 INJECTION, POWDER, FOR SOLUTION INTRAMUSCULAR; INTRAVENOUS at 02:45

## 2021-05-10 RX ADMIN — Medication 325 MILLIGRAM(S): at 12:30

## 2021-05-10 RX ADMIN — Medication 975 MILLIGRAM(S): at 07:46

## 2021-05-10 NOTE — ED CDU PROVIDER SUBSEQUENT DAY NOTE - MEDICAL DECISION MAKING DETAILS
Pt feeling much better today.  tolerating po without vomiting this morning.  no fevers.  will check CBC to screen for worsening anemia today.

## 2021-05-10 NOTE — ED ADULT NURSE REASSESSMENT NOTE - NS ED NURSE REASSESS COMMENT FT1
12.00  Pt is evaluated by CDU MD Jay Tracy  . pt is feeling better.  Pt is discharged . Ml out DANIEL Avery  explained the follow up care & gave the discharge summary  . Pt has stable vitals steady gait A&OX 4 at the time of Discharge

## 2021-05-10 NOTE — ED CDU PROVIDER SUBSEQUENT DAY NOTE - HISTORY
CDU PROGRESS NOTE PA FELI: Pt resting comfortably, NAD, VSS. Abdomen soft, non tender. No rebound or guarding. + CVAT b/l. Pt states feeling better, will continue to monitor.

## 2021-05-10 NOTE — ED ADULT NURSE REASSESSMENT NOTE - GENERAL PATIENT STATE
comfortable appearance/cooperative
cooperative/no change observed/resting/sleeping/smiling/interactive
comfortable appearance
comfortable appearance

## 2021-05-10 NOTE — ED ADULT NURSE REASSESSMENT NOTE - COMFORT CARE
darkened lights/plan of care explained/po fluids offered/repositioned/warm blanket provided
darkened lights/plan of care explained
ambulated to bathroom/po fluids offered/wait time explained
ambulated to bathroom/plan of care explained/side rails up/wait time explained/warm blanket provided

## 2021-05-10 NOTE — ED ADULT NURSE REASSESSMENT NOTE - NSIMPLEMENTINTERV_GEN_ALL_ED
Implemented All Fall with Harm Risk Interventions:  [place] to call system. Call bell, personal items and telephone within reach. Instruct patient to call for assistance. Room bathroom lighting operational. Non-slip footwear when patient is off stretcher. Physically safe environment: no spills, clutter or unnecessary equipment. Stretcher in lowest position, wheels locked, appropriate side rails in place. Provide visual cue, wrist band, yellow gown, etc. Monitor gait and stability. Monitor for mental status changes and reorient to person, place, and time. Review medications for side effects contributing to fall risk. Reinforce activity limits and safety measures with patient and family. Provide visual clues: red socks. Implemented All Universal Safety Interventions:  [place] to call system. Call bell, personal items and telephone within reach. Instruct patient to call for assistance. Room bathroom lighting operational. Non-slip footwear when patient is off stretcher. Physically safe environment: no spills, clutter or unnecessary equipment. Stretcher in lowest position, wheels locked, appropriate side rails in place.

## 2021-05-10 NOTE — ED CDU PROVIDER SUBSEQUENT DAY NOTE - PROGRESS NOTE DETAILS
Patient seen at bedside in NAD.  VSS.  ED tech [employer] assisted w/ [place] translation at bedside (patient accepted this). Patient resting comfortably, endorsing mild frontal headache, will give tylenol. Reports back/leg pain improved from yesterday. Denies current nausea, vomiting, dizziness, cp, abd pain. No current CVA ttp on exam at this time. Appears well. Discussed possible discharge with patient to determine if she would feel comfortable going home, she states she would. Will PO challenge this AM, reassess and will d/w attending. - Phillip Jorgensen PA-C  Mary ID#[id number] used. Patient seen at bedside in NAD.  VSS.  Patient resting comfortably without complaints. Reports feeling much improved from previous. Denies current back pain, n/v, fever/chills, difficulty urinating, abd pain. Appears well, improved from yesterday. Repeat CBC pr Dr. Bond shows H/H of 7.2/27.3, stable from previous, no external bleeding or signs of bleeding on exam, likely chronic iron deficiency anemia, labs c/w this. Made pt aware of this level and need to start her iron supplementation again (states she's been off it for a while). Pt evaluated by ED attending Dr. Bond who cleared her for discharge home. Gave copy of and went over all results with patient at bedside. Patient does not have a primary doctor so will put patient's name in for rapid referral with general internal medicine clinic, made patient aware of this. Advised on strict return precautions. All questions answered and patient acknowledged understanding of all instructions. Stable for d/c home. - Phillip Jorgensen PA-C

## 2021-05-14 LAB
CULTURE RESULTS: SIGNIFICANT CHANGE UP
CULTURE RESULTS: SIGNIFICANT CHANGE UP
SPECIMEN SOURCE: SIGNIFICANT CHANGE UP
SPECIMEN SOURCE: SIGNIFICANT CHANGE UP

## 2022-04-16 NOTE — ED ADULT NURSE NOTE - GASTROINTESTINAL ASSESSMENT
Patient states she has no pain at present. Dr. Cruz Keep in room to discuss test results and discharge plan of care with patient. Patient has been up to the bathroom several times and states she has voided a large amount each time.       Rosalina Bravo RN  04/16/22 [id number] - - -

## 2023-05-25 NOTE — ED CDU PROVIDER DISPOSITION NOTE - PATIENT PORTAL LINK FT
[Follow-Up] : a follow-up evaluation of You can access the FollowMyHealth Patient Portal offered by [facility] by registering at the following website: [email or web address]. By joining [facility]’s FollowMyHealth portal, you will also be able to view your health information using other applications (apps) compatible with our system.